# Patient Record
(demographics unavailable — no encounter records)

---

## 2024-12-12 NOTE — ASSESSMENT
[FreeTextEntry1] : 1) Contact dermatitis on hands: moderate severity.  Options, risks, benefits and alternatives discussed.  Patient advised to start fluocinonide ointment twice a day to the affected area on hands / sed including possible risk of irritation and  Counseling to avoid strong sun & Use Sunscreen spf 30 to avoid worsening of hyperpigmentation.  2) Xerosis- counseling for sensitive skin done/ use Eucerin wash and consider Eucerin eczema cream during day/ work and aquaphor oint at home.   rtc 6-8 wks

## 2024-12-12 NOTE — HISTORY OF PRESENT ILLNESS
[FreeTextEntry1] : itchy rashes on hand [de-identified] : 31 yo M c/o rashes with dry skin on hands for years now worse for weeks with scaly dark itchy plaques, using OTC moisturizers and no medication for it.  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  No other skin problems.  No allergies.

## 2024-12-12 NOTE — PHYSICAL EXAM
[FreeTextEntry3] : Scaly hyperpigmented plaques on the R hand/ finger and between fingers  Diffuse xerosis including on extremities blt

## 2025-02-07 NOTE — PHYSICAL EXAM
[Normal] : normal rate, regular rhythm, normal S1 and S2 and no murmur heard [de-identified] : Lump on Rt Chest. Non tender. Mobile

## 2025-02-07 NOTE — HISTORY OF PRESENT ILLNESS
[FreeTextEntry1] : Follow up. Lump on the Rt chest. No other Complaints [de-identified] : No hx of Allergies